# Patient Record
Sex: MALE | Race: WHITE
[De-identification: names, ages, dates, MRNs, and addresses within clinical notes are randomized per-mention and may not be internally consistent; named-entity substitution may affect disease eponyms.]

---

## 2023-07-10 PROBLEM — Z00.00 ENCOUNTER FOR PREVENTIVE HEALTH EXAMINATION: Status: ACTIVE | Noted: 2023-07-10

## 2023-07-20 ENCOUNTER — APPOINTMENT (OUTPATIENT)
Dept: ENDOCRINOLOGY | Facility: CLINIC | Age: 33
End: 2023-07-20
Payer: COMMERCIAL

## 2023-07-20 ENCOUNTER — TRANSCRIPTION ENCOUNTER (OUTPATIENT)
Age: 33
End: 2023-07-20

## 2023-07-20 ENCOUNTER — NON-APPOINTMENT (OUTPATIENT)
Age: 33
End: 2023-07-20

## 2023-07-20 VITALS
WEIGHT: 211 LBS | HEART RATE: 82 BPM | DIASTOLIC BLOOD PRESSURE: 79 MMHG | HEIGHT: 72 IN | BODY MASS INDEX: 28.58 KG/M2 | SYSTOLIC BLOOD PRESSURE: 125 MMHG

## 2023-07-20 DIAGNOSIS — Z82.49 FAMILY HISTORY OF ISCHEMIC HEART DISEASE AND OTHER DISEASES OF THE CIRCULATORY SYSTEM: ICD-10-CM

## 2023-07-20 DIAGNOSIS — Z86.39 PERSONAL HISTORY OF OTHER ENDOCRINE, NUTRITIONAL AND METABOLIC DISEASE: ICD-10-CM

## 2023-07-20 DIAGNOSIS — Z78.9 OTHER SPECIFIED HEALTH STATUS: ICD-10-CM

## 2023-07-20 LAB — GLUCOSE BLDC GLUCOMTR-MCNC: 297

## 2023-07-20 PROCEDURE — 99205 OFFICE O/P NEW HI 60 MIN: CPT | Mod: 25

## 2023-07-20 PROCEDURE — 82962 GLUCOSE BLOOD TEST: CPT

## 2023-07-20 PROCEDURE — 83036 HEMOGLOBIN GLYCOSYLATED A1C: CPT | Mod: QW

## 2023-07-20 RX ORDER — EMPAGLIFLOZIN AND METFORMIN HYDROCHLORIDE 5; 500 MG/1; MG/1
5-500 TABLET ORAL
Qty: 180 | Refills: 0 | Status: ACTIVE | COMMUNITY
Start: 2023-07-20 | End: 1900-01-01

## 2023-07-20 NOTE — HISTORY OF PRESENT ILLNESS
[FreeTextEntry1] : 32 y.o. male who was diagnosed with type 2 DM after a period of polyuria, polydipsia and weight loss about 1 yr ago.\par He has been managing his DM with lifestyle measures with HbA1C reportedly 7.3% about 2 weeks ago.\par He had a trial of metformin at the time of dx but did not tolerate this medications, however the initial dose of metformin was 2,000 mg/day.\par His weight has been stable.\par His last eye exam was reportedly 1 yr ago - normal.\par HbA1C today is 7.9%, glucose - 297 mg/dl.

## 2023-07-20 NOTE — ASSESSMENT
[FreeTextEntry1] : DM, type 2, poor glycemic control.\par Overweight.\par \par Will institute CGM.\par Will begin Synjardy - side effects discussed.\par F/U - 3 months\par Medications prescribed.

## 2023-07-21 LAB — HBA1C MFR BLD HPLC: 7.9

## 2023-07-24 ENCOUNTER — APPOINTMENT (OUTPATIENT)
Dept: HEART AND VASCULAR | Facility: CLINIC | Age: 33
End: 2023-07-24
Payer: COMMERCIAL

## 2023-07-24 ENCOUNTER — NON-APPOINTMENT (OUTPATIENT)
Age: 33
End: 2023-07-24

## 2023-07-24 VITALS
HEART RATE: 80 BPM | DIASTOLIC BLOOD PRESSURE: 78 MMHG | SYSTOLIC BLOOD PRESSURE: 122 MMHG | HEIGHT: 72 IN | RESPIRATION RATE: 12 BRPM | WEIGHT: 212 LBS | BODY MASS INDEX: 28.71 KG/M2

## 2023-07-24 DIAGNOSIS — R06.02 SHORTNESS OF BREATH: ICD-10-CM

## 2023-07-24 DIAGNOSIS — R53.83 OTHER FATIGUE: ICD-10-CM

## 2023-07-24 PROCEDURE — 93306 TTE W/DOPPLER COMPLETE: CPT

## 2023-07-24 PROCEDURE — ZZZZZ: CPT

## 2023-07-24 PROCEDURE — 93000 ELECTROCARDIOGRAM COMPLETE: CPT

## 2023-07-24 PROCEDURE — 99204 OFFICE O/P NEW MOD 45 MIN: CPT

## 2023-07-24 NOTE — HISTORY OF PRESENT ILLNESS
[FreeTextEntry1] : 32-year-old male with past medical history of type 2 diabetes diagnosed 1 year ago under the care of endocrinology comes in for evaluation of exertional fatigue.  Patient reports a few weeks of exertional fatigue and sob most notably when climbing stairs denies any chest discomfort PND orthopnea.

## 2023-07-24 NOTE — DISCUSSION/SUMMARY
[FreeTextEntry1] : 1.  Shortness of breath: Given history of diabetes and family history of early atherosclerotic heart disease will obtain EKG echocardiogram and ETT and advise once results are known.\par 2.  Fatigue: Patient recently started on Synjardy yesterday\par 3.  Diabetes: Patient following closely with endocrinology. [EKG obtained to assist in diagnosis and management of assessed problem(s)] : EKG obtained to assist in diagnosis and management of assessed problem(s)

## 2023-07-24 NOTE — PHYSICAL EXAM

## 2023-07-26 ENCOUNTER — NON-APPOINTMENT (OUTPATIENT)
Age: 33
End: 2023-07-26

## 2023-08-07 ENCOUNTER — APPOINTMENT (OUTPATIENT)
Dept: HEART AND VASCULAR | Facility: CLINIC | Age: 33
End: 2023-08-07
Payer: COMMERCIAL

## 2023-08-07 PROCEDURE — 93015 CV STRESS TEST SUPVJ I&R: CPT

## 2023-08-30 ENCOUNTER — APPOINTMENT (OUTPATIENT)
Dept: ENDOCRINOLOGY | Facility: CLINIC | Age: 33
End: 2023-08-30

## 2023-11-13 ENCOUNTER — APPOINTMENT (OUTPATIENT)
Dept: ENDOCRINOLOGY | Facility: CLINIC | Age: 33
End: 2023-11-13
Payer: COMMERCIAL

## 2023-11-13 VITALS
DIASTOLIC BLOOD PRESSURE: 80 MMHG | SYSTOLIC BLOOD PRESSURE: 131 MMHG | HEART RATE: 72 BPM | HEIGHT: 72 IN | BODY MASS INDEX: 28.31 KG/M2 | WEIGHT: 209 LBS

## 2023-11-13 LAB — GLUCOSE BLDC GLUCOMTR-MCNC: 105

## 2023-11-13 PROCEDURE — 99214 OFFICE O/P EST MOD 30 MIN: CPT | Mod: 25

## 2023-11-13 PROCEDURE — 82962 GLUCOSE BLOOD TEST: CPT

## 2024-03-18 ENCOUNTER — APPOINTMENT (OUTPATIENT)
Dept: ENDOCRINOLOGY | Facility: CLINIC | Age: 34
End: 2024-03-18
Payer: COMMERCIAL

## 2024-03-18 VITALS
BODY MASS INDEX: 28.44 KG/M2 | WEIGHT: 210 LBS | HEART RATE: 76 BPM | DIASTOLIC BLOOD PRESSURE: 71 MMHG | HEIGHT: 72 IN | SYSTOLIC BLOOD PRESSURE: 134 MMHG

## 2024-03-18 DIAGNOSIS — E66.9 OBESITY, UNSPECIFIED: ICD-10-CM

## 2024-03-18 DIAGNOSIS — K76.0 FATTY (CHANGE OF) LIVER, NOT ELSEWHERE CLASSIFIED: ICD-10-CM

## 2024-03-18 DIAGNOSIS — E11.9 TYPE 2 DIABETES MELLITUS W/OUT COMPLICATIONS: ICD-10-CM

## 2024-03-18 DIAGNOSIS — E78.5 HYPERLIPIDEMIA, UNSPECIFIED: ICD-10-CM

## 2024-03-18 LAB
GLUCOSE BLDC GLUCOMTR-MCNC: 134
HBA1C MFR BLD HPLC: 6.4

## 2024-03-18 PROCEDURE — 99214 OFFICE O/P EST MOD 30 MIN: CPT

## 2024-03-18 PROCEDURE — 82962 GLUCOSE BLOOD TEST: CPT

## 2024-03-18 PROCEDURE — G2211 COMPLEX E/M VISIT ADD ON: CPT

## 2024-03-18 PROCEDURE — 83036 HEMOGLOBIN GLYCOSYLATED A1C: CPT | Mod: QW

## 2024-03-18 RX ORDER — BLOOD-GLUCOSE SENSOR
EACH MISCELLANEOUS
Qty: 6 | Refills: 3 | Status: ACTIVE | COMMUNITY
Start: 2023-07-20 | End: 1900-01-01

## 2024-03-18 RX ORDER — EMPAGLIFLOZIN AND METFORMIN HYDROCHLORIDE 12.5; 5 MG/1; MG/1
12.5-5 TABLET ORAL
Qty: 90 | Refills: 3 | Status: ACTIVE | COMMUNITY
Start: 2023-11-13 | End: 1900-01-01

## 2024-03-18 NOTE — ADDENDUM
[FreeTextEntry1] : This time included review of previous records,  lab. and CGM data, discussing findings, differential diagnosis, glycemic, weight, BP and lipid goals; complications of diabetes; weight management - diet, exercise; prevention and management of hypoglycemia; further testing and evaluation, therapeutic options, renewing medications, completing the record.

## 2024-03-18 NOTE — REASON FOR VISIT
[Follow - Up] : a follow-up visit [DM Type 2] : DM Type 2 [Other___] : [unfilled] [Weight Management/Obesity] : weight management/obesity

## 2024-03-18 NOTE — HISTORY OF PRESENT ILLNESS
[FreeTextEntry1] : 32 y.o. male who was diagnosed with type 2 DM after a period of polyuria, polydipsia and weight loss about 1 yr ago. He has been managing his DM with lifestyle measures with HbA1C reportedly 7.3% about 2 weeks ago. He had a trial of metformin at the time of dx but did not tolerate this medications, however the initial dose of metformin was 2,000 mg/day. His weight has been stable. His last eye exam was reportedly 1 yr ago - normal. HbA1C today is 7.9%, glucose - 297 mg/dl. 11/13/23. The patient is feeling much better. He is now on Synjardy 5/500 mg once with dinner. He has lost 2 lb. He has modified his diet. He does not use CGM but his BSs by HB[facility] are in 100 - 150 mg/dl range. He reports no hypoglycemia. His last eye exam was yesterday, reportedly without retinopathy. HbA1C was 6.7% about 1 week ago. 3/18/24. The patient is doing well but is unable to lose weight. HbA1C today is 7.4%, glucose - 134 mg/dl. ALT was 65 U/L on 3/14/24. Last ophthalm. exam was 6 months ago, reportedly without retinopathy.

## 2024-03-18 NOTE — ASSESSMENT
[FreeTextEntry1] : DM, type 2. Overweight Abnormal LFTs, probably NAFLD Hyperlipidemia  Will continue current management GLP1 /GIP agonist tx discussed; device demonstrated and taught F/U - 6 months

## 2024-03-18 NOTE — PHYSICAL EXAM
[Alert] : alert [Well Nourished] : well nourished [Well Developed] : well developed [No Acute Distress] : no acute distress [Normal Sclera/Conjunctiva] : normal sclera/conjunctiva [EOMI] : extra ocular movement intact [No Proptosis] : no proptosis [Normal Oropharynx] : the oropharynx was normal [No Thyroid Nodules] : no palpable thyroid nodules [Thyroid Not Enlarged] : the thyroid was not enlarged [No Respiratory Distress] : no respiratory distress [No Accessory Muscle Use] : no accessory muscle use [Clear to Auscultation] : lungs were clear to auscultation bilaterally [Normal S1, S2] : normal S1 and S2 [Normal Rate] : heart rate was normal [Regular Rhythm] : with a regular rhythm [No Edema] : no peripheral edema [Normal Bowel Sounds] : normal bowel sounds [Pedal Pulses Normal] : the pedal pulses are present [Not Distended] : not distended [Not Tender] : non-tender [Soft] : abdomen soft [Normal Anterior Cervical Nodes] : no anterior cervical lymphadenopathy [Normal Posterior Cervical Nodes] : no posterior cervical lymphadenopathy [No Spinal Tenderness] : no spinal tenderness [No Stigmata of Cushings Syndrome] : no stigmata of Cushings Syndrome [Spine Straight] : spine straight [Normal Gait] : normal gait [No Rash] : no rash [Normal Strength/Tone] : muscle strength and tone were normal [Acanthosis Nigricans] : no acanthosis nigricans [Normal Reflexes] : deep tendon reflexes were 2+ and symmetric [No Tremors] : no tremors [Oriented x3] : oriented to person, place, and time

## 2024-03-19 ENCOUNTER — TRANSCRIPTION ENCOUNTER (OUTPATIENT)
Age: 34
End: 2024-03-19

## 2024-03-19 LAB
CREAT SPEC-SCNC: 54 MG/DL
MICROALBUMIN 24H UR DL<=1MG/L-MCNC: <1.2 MG/DL
MICROALBUMIN/CREAT 24H UR-RTO: NORMAL MG/G

## 2024-10-08 ENCOUNTER — APPOINTMENT (OUTPATIENT)
Dept: ENDOCRINOLOGY | Facility: CLINIC | Age: 34
End: 2024-10-08
Payer: COMMERCIAL

## 2024-10-08 VITALS
WEIGHT: 203 LBS | HEART RATE: 86 BPM | SYSTOLIC BLOOD PRESSURE: 135 MMHG | DIASTOLIC BLOOD PRESSURE: 90 MMHG | BODY MASS INDEX: 27.53 KG/M2

## 2024-10-08 DIAGNOSIS — E11.9 TYPE 2 DIABETES MELLITUS W/OUT COMPLICATIONS: ICD-10-CM

## 2024-10-08 DIAGNOSIS — K76.0 FATTY (CHANGE OF) LIVER, NOT ELSEWHERE CLASSIFIED: ICD-10-CM

## 2024-10-08 DIAGNOSIS — E78.5 HYPERLIPIDEMIA, UNSPECIFIED: ICD-10-CM

## 2024-10-08 DIAGNOSIS — E66.9 OBESITY, UNSPECIFIED: ICD-10-CM

## 2024-10-08 LAB — GLUCOSE BLDC GLUCOMTR-MCNC: 112

## 2024-10-08 PROCEDURE — 99214 OFFICE O/P EST MOD 30 MIN: CPT

## 2024-10-08 PROCEDURE — 82962 GLUCOSE BLOOD TEST: CPT

## 2024-10-08 PROCEDURE — G2211 COMPLEX E/M VISIT ADD ON: CPT

## 2024-11-19 ENCOUNTER — RX RENEWAL (OUTPATIENT)
Age: 34
End: 2024-11-19

## 2025-01-23 ENCOUNTER — APPOINTMENT (OUTPATIENT)
Dept: ENDOCRINOLOGY | Facility: CLINIC | Age: 35
End: 2025-01-23
Payer: COMMERCIAL

## 2025-01-23 VITALS
SYSTOLIC BLOOD PRESSURE: 138 MMHG | HEIGHT: 72 IN | WEIGHT: 208 LBS | HEART RATE: 78 BPM | DIASTOLIC BLOOD PRESSURE: 67 MMHG | BODY MASS INDEX: 28.17 KG/M2

## 2025-01-23 LAB — GLUCOSE BLDC GLUCOMTR-MCNC: 132

## 2025-01-23 PROCEDURE — 95251 CONT GLUC MNTR ANALYSIS I&R: CPT

## 2025-01-23 PROCEDURE — G2211 COMPLEX E/M VISIT ADD ON: CPT

## 2025-01-23 PROCEDURE — 99213 OFFICE O/P EST LOW 20 MIN: CPT

## 2025-01-23 PROCEDURE — 82962 GLUCOSE BLOOD TEST: CPT

## 2025-01-23 RX ORDER — TIRZEPATIDE 2.5 MG/.5ML
2.5 INJECTION, SOLUTION SUBCUTANEOUS
Qty: 6 | Refills: 1 | Status: ACTIVE | COMMUNITY
Start: 2025-01-23 | End: 1900-01-01

## 2025-04-03 ENCOUNTER — APPOINTMENT (OUTPATIENT)
Dept: ENDOCRINOLOGY | Facility: CLINIC | Age: 35
End: 2025-04-03
Payer: COMMERCIAL

## 2025-04-03 VITALS
DIASTOLIC BLOOD PRESSURE: 69 MMHG | BODY MASS INDEX: 25.9 KG/M2 | HEART RATE: 69 BPM | SYSTOLIC BLOOD PRESSURE: 129 MMHG | WEIGHT: 191 LBS

## 2025-04-03 DIAGNOSIS — E66.9 OBESITY, UNSPECIFIED: ICD-10-CM

## 2025-04-03 DIAGNOSIS — E78.5 HYPERLIPIDEMIA, UNSPECIFIED: ICD-10-CM

## 2025-04-03 DIAGNOSIS — K76.0 FATTY (CHANGE OF) LIVER, NOT ELSEWHERE CLASSIFIED: ICD-10-CM

## 2025-04-03 DIAGNOSIS — E11.9 TYPE 2 DIABETES MELLITUS W/OUT COMPLICATIONS: ICD-10-CM

## 2025-04-03 PROCEDURE — 83036 HEMOGLOBIN GLYCOSYLATED A1C: CPT | Mod: QW

## 2025-04-03 PROCEDURE — 82962 GLUCOSE BLOOD TEST: CPT

## 2025-04-03 PROCEDURE — 99214 OFFICE O/P EST MOD 30 MIN: CPT

## 2025-06-18 ENCOUNTER — APPOINTMENT (OUTPATIENT)
Dept: HEART AND VASCULAR | Facility: CLINIC | Age: 35
End: 2025-06-18
Payer: COMMERCIAL

## 2025-06-18 ENCOUNTER — NON-APPOINTMENT (OUTPATIENT)
Age: 35
End: 2025-06-18

## 2025-06-18 VITALS
DIASTOLIC BLOOD PRESSURE: 78 MMHG | WEIGHT: 191 LBS | BODY MASS INDEX: 25.87 KG/M2 | HEIGHT: 72 IN | HEART RATE: 70 BPM | RESPIRATION RATE: 12 BRPM | SYSTOLIC BLOOD PRESSURE: 122 MMHG

## 2025-06-18 PROCEDURE — 99214 OFFICE O/P EST MOD 30 MIN: CPT

## 2025-08-28 ENCOUNTER — APPOINTMENT (OUTPATIENT)
Dept: ENDOCRINOLOGY | Facility: CLINIC | Age: 35
End: 2025-08-28
Payer: COMMERCIAL

## 2025-08-28 VITALS
HEART RATE: 73 BPM | BODY MASS INDEX: 25.9 KG/M2 | SYSTOLIC BLOOD PRESSURE: 134 MMHG | WEIGHT: 191 LBS | DIASTOLIC BLOOD PRESSURE: 85 MMHG

## 2025-08-28 DIAGNOSIS — R53.83 OTHER FATIGUE: ICD-10-CM

## 2025-08-28 DIAGNOSIS — K76.0 FATTY (CHANGE OF) LIVER, NOT ELSEWHERE CLASSIFIED: ICD-10-CM

## 2025-08-28 DIAGNOSIS — E78.5 HYPERLIPIDEMIA, UNSPECIFIED: ICD-10-CM

## 2025-08-28 DIAGNOSIS — E66.9 OBESITY, UNSPECIFIED: ICD-10-CM

## 2025-08-28 DIAGNOSIS — R79.9 ABNORMAL FINDING OF BLOOD CHEMISTRY, UNSPECIFIED: ICD-10-CM

## 2025-08-28 DIAGNOSIS — E11.9 TYPE 2 DIABETES MELLITUS W/OUT COMPLICATIONS: ICD-10-CM

## 2025-08-28 PROCEDURE — 36415 COLL VENOUS BLD VENIPUNCTURE: CPT

## 2025-08-28 PROCEDURE — 99214 OFFICE O/P EST MOD 30 MIN: CPT

## 2025-08-28 PROCEDURE — 83036 HEMOGLOBIN GLYCOSYLATED A1C: CPT | Mod: QW

## 2025-08-28 PROCEDURE — 82962 GLUCOSE BLOOD TEST: CPT

## 2025-08-29 ENCOUNTER — TRANSCRIPTION ENCOUNTER (OUTPATIENT)
Age: 35
End: 2025-08-29

## 2025-08-29 LAB
ALBUMIN SERPL ELPH-MCNC: 4.7 G/DL
ALP BLD-CCNC: 33 U/L
ALT SERPL-CCNC: 31 U/L
ANION GAP SERPL CALC-SCNC: 14 MMOL/L
AST SERPL-CCNC: 20 U/L
BASOPHILS # BLD AUTO: 0.05 K/UL
BASOPHILS NFR BLD AUTO: 0.7 %
BILIRUB SERPL-MCNC: 0.5 MG/DL
BUN SERPL-MCNC: 12 MG/DL
CALCIUM SERPL-MCNC: 9.7 MG/DL
CHLORIDE SERPL-SCNC: 105 MMOL/L
CHOLEST SERPL-MCNC: 149 MG/DL
CO2 SERPL-SCNC: 24 MMOL/L
CREAT SERPL-MCNC: 0.82 MG/DL
EGFRCR SERPLBLD CKD-EPI 2021: 118 ML/MIN/1.73M2
EOSINOPHIL # BLD AUTO: 0.34 K/UL
EOSINOPHIL NFR BLD AUTO: 4.6 %
ESTIMATED AVERAGE GLUCOSE: 103 MG/DL
GLUCOSE SERPL-MCNC: 93 MG/DL
HBA1C MFR BLD HPLC: 5.2 %
HCT VFR BLD CALC: 46.7 %
HDLC SERPL-MCNC: 33 MG/DL
HGB BLD-MCNC: 15.5 G/DL
IMM GRANULOCYTES NFR BLD AUTO: 0.3 %
LDLC SERPL-MCNC: 89 MG/DL
LYMPHOCYTES # BLD AUTO: 2.33 K/UL
LYMPHOCYTES NFR BLD AUTO: 31.5 %
MAN DIFF?: NORMAL
MCHC RBC-ENTMCNC: 29.3 PG
MCHC RBC-ENTMCNC: 33.2 G/DL
MCV RBC AUTO: 88.3 FL
MONOCYTES # BLD AUTO: 0.69 K/UL
MONOCYTES NFR BLD AUTO: 9.3 %
NEUTROPHILS # BLD AUTO: 3.96 K/UL
NEUTROPHILS NFR BLD AUTO: 53.6 %
NONHDLC SERPL-MCNC: 115 MG/DL
PLATELET # BLD AUTO: 187 K/UL
POTASSIUM SERPL-SCNC: 4.6 MMOL/L
PROT SERPL-MCNC: 7.1 G/DL
RBC # BLD: 5.29 M/UL
RBC # FLD: 13.2 %
SODIUM SERPL-SCNC: 143 MMOL/L
T3 SERPL-MCNC: 141 NG/DL
T4 FREE SERPL-MCNC: 1.4 NG/DL
TRIGL SERPL-MCNC: 150 MG/DL
TSH SERPL-ACNC: 1.34 UIU/ML
WBC # FLD AUTO: 7.39 K/UL

## 2025-09-02 ENCOUNTER — TRANSCRIPTION ENCOUNTER (OUTPATIENT)
Age: 35
End: 2025-09-02